# Patient Record
Sex: MALE | Race: WHITE | NOT HISPANIC OR LATINO
[De-identification: names, ages, dates, MRNs, and addresses within clinical notes are randomized per-mention and may not be internally consistent; named-entity substitution may affect disease eponyms.]

---

## 2021-10-27 ENCOUNTER — NON-APPOINTMENT (OUTPATIENT)
Age: 61
End: 2021-10-27

## 2021-10-27 ENCOUNTER — APPOINTMENT (OUTPATIENT)
Dept: ORTHOPEDIC SURGERY | Facility: CLINIC | Age: 61
End: 2021-10-27
Payer: COMMERCIAL

## 2021-10-27 VITALS
BODY MASS INDEX: 37.94 KG/M2 | HEART RATE: 88 BPM | WEIGHT: 265 LBS | DIASTOLIC BLOOD PRESSURE: 82 MMHG | HEIGHT: 70 IN | SYSTOLIC BLOOD PRESSURE: 149 MMHG

## 2021-10-27 DIAGNOSIS — Z86.39 PERSONAL HISTORY OF OTHER ENDOCRINE, NUTRITIONAL AND METABOLIC DISEASE: ICD-10-CM

## 2021-10-27 DIAGNOSIS — F17.200 NICOTINE DEPENDENCE, UNSPECIFIED, UNCOMPLICATED: ICD-10-CM

## 2021-10-27 DIAGNOSIS — M79.642 PAIN IN LEFT HAND: ICD-10-CM

## 2021-10-27 PROCEDURE — 99203 OFFICE O/P NEW LOW 30 MIN: CPT

## 2021-10-27 PROCEDURE — 73130 X-RAY EXAM OF HAND: CPT | Mod: LT

## 2021-10-27 NOTE — PHYSICAL EXAM
[de-identified] : - Constitutional: This is a male in no obvious distress.  \par - Psych: Patient is alert and oriented to person, place and time.  Patient has a normal mood and affect.\par - Cardiovascular: Normal pulses throughout the upper extremities.  No significant varicosities are noted in the upper extremities. \par - Neuro: Strength and sensation are intact throughout the upper extremities.  Patient has normal coordination.\par - Respiratory:  Patient exhibits no evidence of shortness of breath or difficulty breathing.\par - Skin: No rashes, lesions, or other abnormalities are noted in the upper extremities.\par \par --- \par \par Examination of his left hand demonstrates diffuse swelling.  He is most tender along the third, fourth and fifth metacarpals.  He has full extension with mild limitation terminal flexion.  There is no obvious rotational deformity.  He is neurovascularly intact distally. [de-identified] : AP, lateral, and oblique radiographs of the left hand demonstrate a bone spur/prominence noted on the lateral at the bases of the metacarpals.  There is no obvious metacarpal fracture noted on the AP and lateral views.  There is a calcification noted along the ulnar aspect of the middle finger MCP joint which appears chronic in nature.

## 2021-10-27 NOTE — END OF VISIT
[FreeTextEntry3] : This note was written by Janae Chavez on 10/27/2021 acting solely as a scribe for Dr. Kel Corea.\par  \par All medical record entries made by the Scribe were at my, Dr. Kel Corea, direction and personally dictated by me on 10/27/2021. I have personally reviewed the chart and agree that the record accurately reflects my personal performance of the history, physical exam, assessment and plan.

## 2021-10-27 NOTE — HISTORY OF PRESENT ILLNESS
[FreeTextEntry1] : He comes in today for evaluation of a left hand injury which occurred on 1 week at which time he was walking his dog and his left hand got caught in the leash. He has pain and swelling to the hand and digits. The swelling as not subsided as of last week. He notes the pain to have a throbbing and sharp quality. He reports limited ROM but denies numbness and tingling at this time. He rates his pain as a 7 out of 10.

## 2021-10-27 NOTE — ADDENDUM
[FreeTextEntry1] : I, Janae Chavez, acted solely as a scribe for Dr. Corea on this date on 10/27/2021.

## 2021-10-27 NOTE — DISCUSSION/SUMMARY
[FreeTextEntry1] : He has findings consistent with a left hand sprain, possibly an occult fracture of one of the ulnar metacarpals.  In either case, the treatment at this point would be the same.\par \par I had a discussion with the patient regarding today's visit, the prognosis of this diagnosis, and treatment recommendations and options. At this time, he was instructed on activity modification, the use of ice and fitted with a left carpal tunnel splint. He will follow up in 2 weeks.\par \par He has agreed to the above plan of management and has expressed full understanding.  All questions were fully answered to their satisfaction. \par \par My cumulative time spent on this visit included: Preparation for the visit, review of the medical records, review of pertinent diagnostic studies, examination and counseling of the patient on the above diagnosis, treatment plan and prognosis, orders of diagnostic tests, medication and/or appropriate procedures and documentation in the medical records of today's visit.

## 2021-10-29 ENCOUNTER — NON-APPOINTMENT (OUTPATIENT)
Age: 61
End: 2021-10-29

## 2021-11-03 PROBLEM — S63.92XA SPRAIN OF HAND, LEFT: Status: ACTIVE | Noted: 2021-10-27

## 2021-11-10 ENCOUNTER — APPOINTMENT (OUTPATIENT)
Dept: ORTHOPEDIC SURGERY | Facility: CLINIC | Age: 61
End: 2021-11-10

## 2021-11-10 DIAGNOSIS — S63.92XA SPRAIN OF UNSPECIFIED PART OF LEFT WRIST AND HAND, INITIAL ENCOUNTER: ICD-10-CM
